# Patient Record
Sex: FEMALE | Race: BLACK OR AFRICAN AMERICAN | HISPANIC OR LATINO | URBAN - METROPOLITAN AREA
[De-identification: names, ages, dates, MRNs, and addresses within clinical notes are randomized per-mention and may not be internally consistent; named-entity substitution may affect disease eponyms.]

---

## 2022-08-09 ENCOUNTER — WEB ENCOUNTER (OUTPATIENT)
Dept: URBAN - METROPOLITAN AREA CLINIC 78 | Facility: CLINIC | Age: 47
End: 2022-08-09

## 2022-08-11 ENCOUNTER — WEB ENCOUNTER (OUTPATIENT)
Dept: URBAN - METROPOLITAN AREA CLINIC 78 | Facility: CLINIC | Age: 47
End: 2022-08-11

## 2022-08-12 ENCOUNTER — OFFICE VISIT (OUTPATIENT)
Dept: URBAN - METROPOLITAN AREA CLINIC 78 | Facility: CLINIC | Age: 47
End: 2022-08-12
Payer: COMMERCIAL

## 2022-08-12 VITALS
TEMPERATURE: 98 F | HEIGHT: 65 IN | SYSTOLIC BLOOD PRESSURE: 113 MMHG | WEIGHT: 239 LBS | BODY MASS INDEX: 39.82 KG/M2 | DIASTOLIC BLOOD PRESSURE: 80 MMHG | HEART RATE: 66 BPM

## 2022-08-12 DIAGNOSIS — D64.9 ANEMIA, UNSPECIFIED TYPE: ICD-10-CM

## 2022-08-12 DIAGNOSIS — R10.13 DYSPEPSIA: ICD-10-CM

## 2022-08-12 DIAGNOSIS — Z80.0 FAMILY HISTORY OF COLON CANCER IN FATHER: ICD-10-CM

## 2022-08-12 DIAGNOSIS — Z12.11 COLON CANCER SCREENING: ICD-10-CM

## 2022-08-12 DIAGNOSIS — Z79.1 NSAID LONG-TERM USE: ICD-10-CM

## 2022-08-12 PROCEDURE — 99203 OFFICE O/P NEW LOW 30 MIN: CPT | Performed by: INTERNAL MEDICINE

## 2022-08-12 RX ORDER — METFORMIN HYDROCHLORIDE 500 MG/1
TABLET, COATED ORAL
Qty: 0 | Refills: 0 | Status: ACTIVE | COMMUNITY
Start: 1900-01-01

## 2022-08-12 RX ORDER — SODIUM, POTASSIUM,MAG SULFATES 17.5-3.13G
177 ML DAY 1 AND 177 ML DAY 2 SOLUTION, RECONSTITUTED, ORAL ORAL
Qty: 354 MILLILITER | Refills: 0 | OUTPATIENT
Start: 2022-08-12 | End: 2022-08-14

## 2022-08-12 NOTE — HPI-TODAY'S VISIT:
Patient was referred by Dr. Kamille Mckay  A copy of this document will be sent to the physician.   Patient is here for anemia  The patient also presents for surveillance colonoscopy .  Patient denies change in bowel habits, appetite, and weight.  Last colonoscopy: 2017  Father had colon cancer in 50's  Father  also has MGUS /MM   Patient reports some food sensitivities  Denies significant changes in bowels  Takes iron   Denies rectal bleeding  Denies melena  Denies dysphagia Admits to heavy cycles .. patient on irons  Rare ETOH use but when she does she will experience heartburn   Takes ibuprofen monthly  for her cycles   Pipoly /mick  will have diarrhea , hence she suspects lactose intolerance       Denies chest pain  Denies SOB  Denies easy bruising  Denies anesthesia complication   Patient takes slow release iron    Labs 7/6/22   Hg 12.6  MCV 84.6   CMP is normal

## 2022-09-21 ENCOUNTER — TELEPHONE ENCOUNTER (OUTPATIENT)
Dept: URBAN - METROPOLITAN AREA CLINIC 78 | Facility: CLINIC | Age: 47
End: 2022-09-21

## 2022-09-27 ENCOUNTER — OFFICE VISIT (OUTPATIENT)
Dept: URBAN - METROPOLITAN AREA SURGERY CENTER 15 | Facility: SURGERY CENTER | Age: 47
End: 2022-09-27
Payer: COMMERCIAL

## 2022-09-27 DIAGNOSIS — Z12.11 COLON CANCER SCREENING: ICD-10-CM

## 2022-09-27 DIAGNOSIS — K29.60 ADENOPAPILLOMATOSIS GASTRICA: ICD-10-CM

## 2022-09-27 PROCEDURE — 43239 EGD BIOPSY SINGLE/MULTIPLE: CPT | Performed by: INTERNAL MEDICINE

## 2022-09-27 PROCEDURE — 45378 DIAGNOSTIC COLONOSCOPY: CPT | Performed by: INTERNAL MEDICINE

## 2022-09-27 PROCEDURE — G8907 PT DOC NO EVENTS ON DISCHARG: HCPCS | Performed by: INTERNAL MEDICINE

## 2022-09-27 RX ORDER — METFORMIN HYDROCHLORIDE 500 MG/1
TABLET, COATED ORAL
Qty: 0 | Refills: 0 | Status: ACTIVE | COMMUNITY
Start: 1900-01-01

## 2022-10-14 ENCOUNTER — LAB OUTSIDE AN ENCOUNTER (OUTPATIENT)
Dept: URBAN - METROPOLITAN AREA CLINIC 78 | Facility: CLINIC | Age: 47
End: 2022-10-14

## 2022-10-14 LAB
CREATININE POC: 0.7
PERFORMING LAB: (no result)

## 2022-10-28 ENCOUNTER — LAB OUTSIDE AN ENCOUNTER (OUTPATIENT)
Dept: URBAN - METROPOLITAN AREA CLINIC 78 | Facility: CLINIC | Age: 47
End: 2022-10-28

## 2022-10-28 ENCOUNTER — OFFICE VISIT (OUTPATIENT)
Dept: URBAN - METROPOLITAN AREA CLINIC 78 | Facility: CLINIC | Age: 47
End: 2022-10-28
Payer: COMMERCIAL

## 2022-10-28 ENCOUNTER — DASHBOARD ENCOUNTERS (OUTPATIENT)
Age: 47
End: 2022-10-28

## 2022-10-28 VITALS
HEART RATE: 78 BPM | BODY MASS INDEX: 38.79 KG/M2 | WEIGHT: 232.8 LBS | HEIGHT: 65 IN | RESPIRATION RATE: 16 BRPM | SYSTOLIC BLOOD PRESSURE: 124 MMHG | TEMPERATURE: 98.1 F | DIASTOLIC BLOOD PRESSURE: 82 MMHG

## 2022-10-28 DIAGNOSIS — K76.0 HEPATIC STEATOSIS: ICD-10-CM

## 2022-10-28 DIAGNOSIS — R16.0 HEPATOMEGALY: ICD-10-CM

## 2022-10-28 PROBLEM — 197321007: Status: ACTIVE | Noted: 2022-10-28

## 2022-10-28 PROCEDURE — 99214 OFFICE O/P EST MOD 30 MIN: CPT | Performed by: INTERNAL MEDICINE

## 2022-10-28 RX ORDER — METFORMIN HYDROCHLORIDE 500 MG/1
TABLET, COATED ORAL
Qty: 0 | Refills: 0 | Status: ACTIVE | COMMUNITY
Start: 1900-01-01

## 2022-10-28 NOTE — HPI-TODAY'S VISIT:
I reviewed the  EGD /colonoscopy findings and prep with patient . Reviewed the path removed including path results  All questions answered  to satisfaction Has switched from copper IUD to Mirena  for heavy cycles to help with anemia    CT scan reviewed :  Hepatonegaly 22.2  Hepatic steatosis    Patient is here for anemia  The patient also presents for surveillance colonoscopy .  Patient denies change in bowel habits, appetite, and weight.  Last colonoscopy: 2017  Father had colon cancer in 50's  Father  also has MGUS /MM   Patient reports some food sensitivities  Denies significant changes in bowels  Takes iron   Denies rectal bleeding  Denies melena  Denies dysphagia Admits to heavy cycles .. patient on irons  Rare ETOH use but when she does she will experience heartburn   Takes ibuprofen monthly  for her cycles   [place] /mick  will have diarrhea , hence she suspects lactose intolerance       Denies chest pain  Denies SOB  Denies easy bruising  Denies anesthesia complication   Patient takes slow release iron    Labs 7/6/22   Hg 12.6  MCV 84.6   CMP is normal

## 2022-10-31 ENCOUNTER — WEB ENCOUNTER (OUTPATIENT)
Dept: URBAN - METROPOLITAN AREA CLINIC 78 | Facility: CLINIC | Age: 47
End: 2022-10-31

## 2022-11-02 LAB
ANA DIRECT: NEGATIVE
ESTIMATED AVERAGE GLUCOSE: 114
FERRITIN, SERUM: 35
HCV AB: 0.1
HEMOGLOBIN A1C: 5.6
INTERPRETATION:: (no result)

## 2022-11-14 ENCOUNTER — WEB ENCOUNTER (OUTPATIENT)
Dept: URBAN - METROPOLITAN AREA CLINIC 78 | Facility: CLINIC | Age: 47
End: 2022-11-14

## 2023-01-23 ENCOUNTER — WEB ENCOUNTER (OUTPATIENT)
Dept: URBAN - METROPOLITAN AREA CLINIC 78 | Facility: CLINIC | Age: 48
End: 2023-01-23